# Patient Record
(demographics unavailable — no encounter records)

---

## 2024-10-10 NOTE — HISTORY OF PRESENT ILLNESS
[FreeTextEntry1] : Rough bump right hand for months. Picks and rubs. Mosquito bugs on legs. [de-identified] : Always attacked by mosquitoes.

## 2024-10-10 NOTE — ASSESSMENT
[FreeTextEntry1] : Alert, oriented, well, pleasant.  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 Petrolatum and dressing daily for 2 weeks.  Erythematous 0.3cm papules with puncta on right leg x4. Arthropod bites.   Avoidance discussed. DEET. Risks reviewed.    triamcinolone cream bid.  Numerous linear crusts left thigh. "new kittens."

## 2024-11-01 NOTE — PHYSICAL EXAM
[Chaperone Present] : A chaperone was present in the examining room during all aspects of the physical examination [[id number]] : A chaperone was present during the pelvic exam. [Alert] : alert [Appropriately responsive] : appropriately responsive [No Acute Distress] : no acute distress [No Lymphadenopathy] : no lymphadenopathy [Soft] : soft [Non-tender] : non-tender [Non-distended] : non-distended [No HSM] : No HSM [No Lesions] : no lesions [No Mass] : no mass [Oriented x3] : oriented x3 [Examination Of The Breasts] : a normal appearance [No Masses] : no breast masses were palpable [Labia Minora] : normal [Labia Majora] : normal [Normal] : normal [Uterine Adnexae] : normal

## 2024-11-11 NOTE — CARDIOLOGY SUMMARY
[de-identified] : November 11, 2024 Sinus rhythm @ 76 bpm   [de-identified] : April 1, 2023 Normal LV systolic function LVEF 65-70% Mild MR Mild aortic regurgitation Mild tricuspid regurgitation Mild calcification of the mitral valve annulus.

## 2024-11-11 NOTE — ASSESSMENT
[FreeTextEntry1] : Ms Weathers returns for a cardiovascular follow up  She carries a history as outlined below: -Hx of Hyperlipidemia -Hx of CAD in native artery -Hx of Obesity -Hx of Elevated Hgb A1C -Hx of MR  #Hyperlipidemia -   October 30, 2024   Total cholesterol    119   HDL                      51   LDL                      54   LDL at goal   Continue on Atorvastatin 20 mg QHS  #Elevated Hgb A1C/ Obesity   October 30, 2024   5.8%   A1C, stable and unchanged  - Encouraged patient to participate in  healthy walking and eating habits, focusing on a Mediterranean style of eating and aiming for the recommended 150 minutes per week of moderate physical activity.  - Encouraged the patient to find healthy outlets and coping mechanisms to help manage stress, such as physical activity/exercise, reducing workload if possible, spending time with family and friends, engaging in an enjoyable hobby, or using meditation or mindfulness techniques.  I spent 30 minutes evaluating patient's history, family medical history and blood pressure management, ordering tests and providing documentation.

## 2024-11-11 NOTE — HISTORY OF PRESENT ILLNESS
[FreeTextEntry1] : Ms. Weathers returns for a routine follow up.  Patient is a 64 yr old F with PMH of hyperlipidemia, Mitral valve prolapse, small PFO ( diagnosed 5 yrs ago),  MVP , mild obesity-> BMI 33.66, asthma. Denies chest pain, shortness of breath, dizziness, lightheadedness, palpitations or near syncope or syncope, orthopnea, PND and increasing lower extremity edema.  # HLD: TC-122, TG 69, LDL 58, HDL 50,  A1C 6.0 Encouraged patient to continue healthy exercise and eating habits, focusing on a Mediterranean style of eating and aiming for the recommended 150 minutes per week of moderate physical activity. Encouraged intermittent fasting  Patient to repeat Labs in 3 months - if A1c not trending down, will consider referral to endocrine for Metformin.   # PFO : TTE with no evidence of PFO   Interval Note September 9, 2024  Ms. Weathers is now 65 years old and returns for a cardiovascular follow up. She carries a history as outlined below:  -Hx of Hyperlipidemia -Hx of CAD in native artery -Hx of Obesity -Hx of Elevated Hgb A1C -Hx of MR  Blood pressure today: 148/88 EKG- Sinus rhythm @ 67 bpm   Reviewed most recent echocardiogram performed on April 10, 2023 LVEF  5% Mild MR Mild AR Mild TR minimal pulmonic regurgitation  Patient acknowledges that she needs to be "more active" Denies any issues with shortness of breath, chest discomfort or palpitations.  Interval Note November 11, 2024  Ms Weahters returns for a cardiovascular follow up  She carries a history as outlined below: -Hx of Hyperlipidemia -Hx of CAD in native artery -Hx of Obesity -Hx of Elevated Hgb A1C -Hx of MR  Blood pressure today: 135/ 85 EKG- Sinus rhythm @ 76 bpm   Patient offers no complaints.  Acknowledges that she does not exercise "at all".   Reviewed recent labs drawn on October 30, 2024 Lipid panel at goal-> LDL 54 mg/ dL Hgb A1C  5.8 TSH, CMP within acceptable limits

## 2024-11-11 NOTE — HISTORY OF PRESENT ILLNESS
[FreeTextEntry1] : Ms. Weathers returns for a routine follow up.  Patient is a 64 yr old F with PMH of hyperlipidemia, Mitral valve prolapse, small PFO ( diagnosed 5 yrs ago),  MVP , mild obesity-> BMI 33.66, asthma. Denies chest pain, shortness of breath, dizziness, lightheadedness, palpitations or near syncope or syncope, orthopnea, PND and increasing lower extremity edema.  # HLD: TC-122, TG 69, LDL 58, HDL 50,  A1C 6.0 Encouraged patient to continue healthy exercise and eating habits, focusing on a Mediterranean style of eating and aiming for the recommended 150 minutes per week of moderate physical activity. Encouraged intermittent fasting  Patient to repeat Labs in 3 months - if A1c not trending down, will consider referral to endocrine for Metformin.   # PFO : TTE with no evidence of PFO   Interval Note September 9, 2024  Ms. Weathers is now 65 years old and returns for a cardiovascular follow up. She carries a history as outlined below:  -Hx of Hyperlipidemia -Hx of CAD in native artery -Hx of Obesity -Hx of Elevated Hgb A1C -Hx of MR  Blood pressure today: 148/88 EKG- Sinus rhythm @ 67 bpm   Reviewed most recent echocardiogram performed on April 10, 2023 LVEF  5% Mild MR Mild AR Mild TR minimal pulmonic regurgitation  Patient acknowledges that she needs to be "more active" Denies any issues with shortness of breath, chest discomfort or palpitations.  Interval Note November 11, 2024  Ms Weathers returns for a cardiovascular follow up  She carries a history as outlined below: -Hx of Hyperlipidemia -Hx of CAD in native artery -Hx of Obesity -Hx of Elevated Hgb A1C -Hx of MR  Blood pressure today: 135/ 85 EKG- Sinus rhythm @ 76 bpm   Patient offers no complaints.  Acknowledges that she does not exercise "at all".   Reviewed recent labs drawn on October 30, 2024 Lipid panel at goal-> LDL 54 mg/ dL Hgb A1C  5.8 TSH, CMP within acceptable limits

## 2024-11-11 NOTE — END OF VISIT
Left message for pt. Informing him that we did receive his message about his rash.  Informed him that there is nothing we need to do about it right now.  If it gets worse, let us know.  Also, reinforced not to take his chemotherapy medication until he talks with Dr. Arias.  Enc. Him to call back with any questions or concerns.    Received message back from pt.  Pt. States that he has no itching or pain from his rash and feeling quite a bit better the last 2 days.  He will continue to hold his osimertinib.  However, holding the Osimertinib makes him nervous and he feels the side effects that he was feeling is more related to the Zometa.  States that last time he received the Zometa, he had the same side effects except for the rash for about 2 weeks.   He just was able to start the Osimertinib and so is concerned about needing to be off of it but will wait to here from us if he can restart it.   Listened and acknowledged his fears and frustrations.  Support provided.  They deny needing anything else at this time.  Enc. Them to call with questions or concerns.  Will continue to follow.   [Time Spent: ___ minutes] : I have spent [unfilled] minutes of time on the encounter which excludes teaching and separately reported services.

## 2024-11-11 NOTE — CARDIOLOGY SUMMARY
[de-identified] : November 11, 2024 Sinus rhythm @ 76 bpm   [de-identified] : April 1, 2023 Normal LV systolic function LVEF 65-70% Mild MR Mild aortic regurgitation Mild tricuspid regurgitation Mild calcification of the mitral valve annulus.

## 2025-03-04 NOTE — PROCEDURE
[de-identified] : Cleaned and checked hearing aids, changed domes and wax guards. Listening check revealed aids in good working order. Pt doing very well with aids. Recommended she return following next audio for reprogramming. Pt expressed understanding of all.

## 2025-03-04 NOTE — HISTORY OF PRESENT ILLNESS
[FreeTextEntry1] : 65 year old female Hearing Aid Dispensed: [employer] Intent 2 MR-R Ears: AU Right SN: [id number] Left SN: [id number]  SN: [id number] Warranty Date: 08/21/2027 Receivers: 85-3 Domes: 8 mm DV. [FreeTextEntry8] : Seen today for routine HAC. Doing well with hearing aids. Will be having audio in June.

## 2025-06-02 NOTE — ASSESSMENT
[FreeTextEntry1] : 67 yo [employer] employee w  elevated CAC - on low dose statin w acceptable LDL ( 60mg/dL. concerned about a1c.. ( 5.9) will discuss w PCP  HTN - some levetate readings - requested home monitering for 2 weeks and to call in w results.  VHD - mild regurg  last echo 2023.

## 2025-06-02 NOTE — PHYSICAL EXAM
[Normal S1, S2] : normal S1, S2 [No Rub] : no rub [No Gallop] : no gallop [Normal] : alert and oriented, normal memory [de-identified] : soft HSM

## 2025-06-02 NOTE — REASON FOR VISIT
[FreeTextEntry1] : 65yo w hx of MVP w mild MR, small PFO. elevtae A1C, abnl CAC ( > 100), intermittently mildly elevated   only issues since last visit - episode of dizziness - [facility] -- felt to be viral issue - covid neg.  Lasted a few days and felt better.  Saw PMD last month - thinks A1c was " ok" -   No CP, Maciel or palpaitations. - reviewed meds We discussed the use of diet and dietary therapy in the management of Crohns Disease. In order to safely and effectively implement the dietary intervention, follow up with our dietician team was recommended. Doing WW - lost 5 lbs.